# Patient Record
Sex: MALE | Employment: FULL TIME | URBAN - METROPOLITAN AREA
[De-identification: names, ages, dates, MRNs, and addresses within clinical notes are randomized per-mention and may not be internally consistent; named-entity substitution may affect disease eponyms.]

---

## 2024-11-04 ENCOUNTER — OFFICE VISIT (OUTPATIENT)
Age: 54
End: 2024-11-04

## 2024-11-04 VITALS
SYSTOLIC BLOOD PRESSURE: 128 MMHG | BODY MASS INDEX: 21 KG/M2 | OXYGEN SATURATION: 95 % | HEART RATE: 71 BPM | WEIGHT: 150 LBS | DIASTOLIC BLOOD PRESSURE: 85 MMHG | HEIGHT: 71 IN

## 2024-11-04 DIAGNOSIS — Z77.098 EXPOSURE TO CHEMICAL COMPOUNDS: Primary | ICD-10-CM

## 2024-11-04 ASSESSMENT — ENCOUNTER SYMPTOMS
CHEST TIGHTNESS: 0
VOMITING: 0
SORE THROAT: 0
SINUS PRESSURE: 0
COUGH: 0
DIARRHEA: 0
TROUBLE SWALLOWING: 0
SHORTNESS OF BREATH: 0
ABDOMINAL PAIN: 0
NAUSEA: 0
WHEEZING: 0
VOICE CHANGE: 0

## 2024-11-05 NOTE — PATIENT INSTRUCTIONS
Normal exam!   If worsening symptoms such as burning in eyes, face, throat or prolonged nausea, vomiting, headache proceed to ER for further assessment.

## 2024-11-05 NOTE — PROGRESS NOTES
Yovany Gordon (:  1970) is a 54 y.o. male,New patient, here for evaluation of the following chief complaint(s):  Other (Chemical exposure)      ASSESSMENT/PLAN:    ICD-10-CM    1. Exposure to chemical compounds  Z77.098           Normal exam!   If worsening symptoms such as burning in eyes, face, throat or prolonged nausea, vomiting, headache proceed to ER for further assessment.     Site where sprayed needs to wait atleast 48 hours for return per roundup safety warning. Wear mask when in area.     SUBJECTIVE/OBJECTIVE:    History provided by:  Patient   used: No      HPI:   54 y.o. male presents with exposure to roundup pesticide. 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   NO current medical issues.   Denies current SOB, eye, skin irritation, change to vision, n/v, abdominal upset, headache, burning of  mouth and throat.    Vitals:    24 1930   BP: 128/85   Site: Left Upper Arm   Position: Sitting   Cuff Size: Large Adult   Pulse: 71   SpO2: 95%   Weight: 68 kg (150 lb)   Height: 1.791 m (5' 10.5\")       Review of Systems   Constitutional:  Negative for chills, diaphoresis, fatigue and fever.   HENT:  Negative for congestion, drooling, ear pain, hearing loss, nosebleeds, sinus pressure, sore throat, trouble swallowing and voice change.    Eyes:  Negative for visual disturbance.   Respiratory:  Negative for cough, chest tightness, shortness of breath and wheezing.    Cardiovascular:  Negative for chest pain.   Gastrointestinal:  Negative for abdominal pain, diarrhea, nausea and vomiting.   Musculoskeletal:  Negative for myalgias.   Skin:  Negative for rash.   Neurological:  Negative for headaches.       Physical Exam  Constitutional:       General: He is not in acute distress.     Appearance: Normal appearance. He is not ill-appearing or toxic-appearing.   HENT:      Right Ear: Tympanic membrane normal.      Left Ear: Tympanic membrane normal.      Nose: